# Patient Record
Sex: FEMALE | Race: WHITE | URBAN - METROPOLITAN AREA
[De-identification: names, ages, dates, MRNs, and addresses within clinical notes are randomized per-mention and may not be internally consistent; named-entity substitution may affect disease eponyms.]

---

## 2020-06-25 ENCOUNTER — PROCEDURE VISIT (OUTPATIENT)
Dept: SURGERY | Age: 45
End: 2020-06-25

## 2020-06-25 ENCOUNTER — TELEPHONE (OUTPATIENT)
Dept: SURGERY | Age: 45
End: 2020-06-25

## 2020-06-25 VITALS — TEMPERATURE: 98.1 F

## 2020-06-25 PROBLEM — Z41.1 ELECTIVE PROCEDURE FOR UNACCEPTABLE COSMETIC APPEARANCE: Status: ACTIVE | Noted: 2020-06-25

## 2020-06-25 PROCEDURE — DM00120 PR DERM ONLY BOTOX INJ LEVEL 3: Performed by: DERMATOLOGY

## 2020-06-25 NOTE — PROGRESS NOTES
BOTULINUM TOXIN PROCEDURE NOTE    Ez Baldwin   YOB: 1975    DATE OF VISIT:  6/25/2020        PATIENT IDENTIFIED PER PROTOCOL: yes  LOCATION(S): forehead, glabella and crows feet  VERIFIED: yes  TECHNIQUES, RISKS, BENEFITS AND ALTERNATIVES EXPLAINED: yes  CONSENT SIGNED, WITNESSED AND DATED: yes      The patient denies pregnancy, breast-feeding, neuromuscular disorders, or having any allergies to the known components of Xeomin. RISKS: Pain with treatment, brow ptosis, eyelid ptosis, bruising, swelling, asymmetry, diploplia, ectropion and unfavorable cosmetic outcome. The impermanent nature of the treatment and its onset were emphasized. The patient was informed that botulinum toxin is FDA approved for the treatment of glabellar and lateral canthal (crow's feet) rhytides and its use for treatment of rhytides in some other areas of the face may be considered off-label use. Pre-procedure photographs were taken. OPERATIVE REPORT      TREATMENT # 1  AREA TO BE TREATED: forehead, glabella and crows feet  PROCEDURE NOTE:  12  Units of Xeomin  Injected into the glabella, 6 units in each crows feet and 8 units in forehead.  32 units total.  COMPLICATIONS: none  WOUND CARE INSTRUCTIONS PROVIDED: yes

## 2020-10-21 ENCOUNTER — PROCEDURE VISIT (OUTPATIENT)
Dept: SURGERY | Age: 45
End: 2020-10-21

## 2020-10-21 VITALS — TEMPERATURE: 97.2 F

## 2020-10-21 PROCEDURE — DM00120 PR DERM ONLY BOTOX INJ LEVEL 3: Performed by: DERMATOLOGY

## 2020-10-21 NOTE — PROGRESS NOTES
BOTULINUM TOXIN PROCEDURE NOTE    Db Gerardo   YOB: 1975    DATE OF VISIT:  10/21/2020        PATIENT IDENTIFIED PER PROTOCOL: yes  LOCATION(S): forehead, glabella and crows feet  VERIFIED: yes  TECHNIQUES, RISKS, BENEFITS AND ALTERNATIVES EXPLAINED: yes  CONSENT SIGNED, WITNESSED AND DATED: yes      The patient denies pregnancy, breast-feeding, neuromuscular disorders, or having any allergies to the known components of Xeomin. RISKS: Pain with treatment, brow ptosis, eyelid ptosis, bruising, swelling, asymmetry, diploplia, ectropion and unfavorable cosmetic outcome. The impermanent nature of the treatment and its onset were emphasized. The patient was informed that botulinum toxin is FDA approved for the treatment of glabellar and lateral canthal (crow's feet) rhytides and its use for treatment of rhytides in some other areas of the face may be considered off-label use. Pre-procedure photographs were taken. OPERATIVE REPORT      TREATMENT # 2  AREA TO BE TREATED: forehead, glabella and crows feet  PROCEDURE NOTE:  12  Units of Xeomin  Injected into the glabella, 6 units in each crows feet and 8 units in forehead.  32 units total.  COMPLICATIONS: none  WOUND CARE INSTRUCTIONS PROVIDED: yes

## 2021-03-03 ENCOUNTER — PROCEDURE VISIT (OUTPATIENT)
Dept: SURGERY | Age: 46
End: 2021-03-03

## 2021-03-03 VITALS — TEMPERATURE: 97 F

## 2021-03-03 DIAGNOSIS — Z41.1 ELECTIVE PROCEDURE FOR UNACCEPTABLE COSMETIC APPEARANCE: Primary | ICD-10-CM

## 2021-03-03 PROCEDURE — DM00120 PR DERM ONLY BOTOX INJ LEVEL 3: Performed by: DERMATOLOGY

## 2024-08-08 ENCOUNTER — ANESTHESIA EVENT (OUTPATIENT)
Dept: ENDOSCOPY | Age: 49
End: 2024-08-08
Payer: COMMERCIAL

## 2024-08-08 ENCOUNTER — ANESTHESIA (OUTPATIENT)
Dept: ENDOSCOPY | Age: 49
End: 2024-08-08
Payer: COMMERCIAL

## 2024-08-08 ENCOUNTER — HOSPITAL ENCOUNTER (OUTPATIENT)
Age: 49
Setting detail: OUTPATIENT SURGERY
Discharge: HOME | End: 2024-08-08
Attending: INTERNAL MEDICINE | Admitting: INTERNAL MEDICINE
Payer: COMMERCIAL

## 2024-08-08 VITALS
DIASTOLIC BLOOD PRESSURE: 65 MMHG | TEMPERATURE: 97.6 F | SYSTOLIC BLOOD PRESSURE: 111 MMHG | RESPIRATION RATE: 16 BRPM | OXYGEN SATURATION: 100 % | HEART RATE: 66 BPM

## 2024-08-08 PROCEDURE — 2580000003 HC RX 258: Performed by: ANESTHESIOLOGY

## 2024-08-08 PROCEDURE — 7100000010 HC PHASE II RECOVERY - FIRST 15 MIN: Performed by: INTERNAL MEDICINE

## 2024-08-08 PROCEDURE — 6360000002 HC RX W HCPCS

## 2024-08-08 PROCEDURE — 7100000011 HC PHASE II RECOVERY - ADDTL 15 MIN: Performed by: INTERNAL MEDICINE

## 2024-08-08 PROCEDURE — 3609027000 HC COLONOSCOPY: Performed by: INTERNAL MEDICINE

## 2024-08-08 PROCEDURE — 2500000003 HC RX 250 WO HCPCS

## 2024-08-08 PROCEDURE — 3700000001 HC ADD 15 MINUTES (ANESTHESIA): Performed by: INTERNAL MEDICINE

## 2024-08-08 PROCEDURE — 3700000000 HC ANESTHESIA ATTENDED CARE: Performed by: INTERNAL MEDICINE

## 2024-08-08 RX ORDER — LIDOCAINE HYDROCHLORIDE 20 MG/ML
INJECTION, SOLUTION INFILTRATION; PERINEURAL PRN
Status: DISCONTINUED | OUTPATIENT
Start: 2024-08-08 | End: 2024-08-08 | Stop reason: SDUPTHER

## 2024-08-08 RX ORDER — SODIUM CHLORIDE, SODIUM LACTATE, POTASSIUM CHLORIDE, CALCIUM CHLORIDE 600; 310; 30; 20 MG/100ML; MG/100ML; MG/100ML; MG/100ML
INJECTION, SOLUTION INTRAVENOUS CONTINUOUS
Status: DISCONTINUED | OUTPATIENT
Start: 2024-08-08 | End: 2024-08-08 | Stop reason: HOSPADM

## 2024-08-08 RX ORDER — PROPOFOL 10 MG/ML
INJECTION, EMULSION INTRAVENOUS PRN
Status: DISCONTINUED | OUTPATIENT
Start: 2024-08-08 | End: 2024-08-08 | Stop reason: SDUPTHER

## 2024-08-08 RX ADMIN — SODIUM CHLORIDE, POTASSIUM CHLORIDE, SODIUM LACTATE AND CALCIUM CHLORIDE: 600; 310; 30; 20 INJECTION, SOLUTION INTRAVENOUS at 07:40

## 2024-08-08 RX ADMIN — LIDOCAINE HYDROCHLORIDE 50 MG: 20 INJECTION, SOLUTION INFILTRATION; PERINEURAL at 08:15

## 2024-08-08 RX ADMIN — PROPOFOL 150 MCG/KG/MIN: 10 INJECTION, EMULSION INTRAVENOUS at 08:17

## 2024-08-08 RX ADMIN — PROPOFOL 50 MG: 10 INJECTION, EMULSION INTRAVENOUS at 08:16

## 2024-08-08 RX ADMIN — PROPOFOL 30 MG: 10 INJECTION, EMULSION INTRAVENOUS at 08:18

## 2024-08-08 ASSESSMENT — PAIN - FUNCTIONAL ASSESSMENT
PAIN_FUNCTIONAL_ASSESSMENT: 0-10
PAIN_FUNCTIONAL_ASSESSMENT: 0-10

## 2024-08-08 NOTE — ANESTHESIA POSTPROCEDURE EVALUATION
Department of Anesthesiology  Postprocedure Note    Patient: Alaina Rodriguez  MRN: [id number]  YOB: 1975  Date of evaluation: 8/8/2024    Procedure Summary       Date: 08/08/24 Room / Location: [place] [address] / [facility]    Anesthesia Start: 0812 Anesthesia Stop: 0835    Procedure: COLONOSCOPY Diagnosis:       Colon cancer screening      (Colon cancer screening [Z12.11])    Surgeons: Edelmira Carrillo MD Responsible Provider: Preston Sanford MD    Anesthesia Type: MAC ASA Status: 1            Anesthesia Type: No value filed.    Janice Phase I: Janice Score: 10    Janice Phase II: Janice Score: 10    Anesthesia Post Evaluation    Patient location during evaluation: PACU  Patient participation: complete - patient participated  Level of consciousness: awake  Airway patency: patent  Nausea & Vomiting: no nausea and no vomiting  Cardiovascular status: blood pressure returned to baseline and hemodynamically stable  Respiratory status: acceptable  Hydration status: euvolemic  Multimodal analgesia pain management approach  Pain management: adequate    No notable events documented.

## 2024-08-08 NOTE — ANESTHESIA PRE PROCEDURE
Department of Anesthesiology  Preprocedure Note       Name:  Alaina Rodriguez   Age:  49 y.o.  :  1975                                          MRN:  [id number]         Date:  2024      Surgeon: Surgeon(s):  Edelmira Carrillo MD    Procedure: Procedure(s):  COLONOSCOPY    Medications prior to admission:   Prior to Admission medications    Not on File       Current medications:    No current facility-administered medications for this encounter.       Allergies:  No Known Allergies    Problem List:    Patient Active Problem List   Diagnosis Code   • Elective procedure for unacceptable cosmetic appearance Z41.1       Past Medical History:  No past medical history on file.    Past Surgical History:  No past surgical history on file.    Social History:    Social History     Tobacco Use   • Smoking status: Never   • Smokeless tobacco: Never   Substance Use Topics   • Alcohol use: Not on file                                Counseling given: Not Answered      Vital Signs (Current): There were no vitals filed for this visit.                                           BP Readings from Last 3 Encounters:   No data found for BP       NPO Status:                                                                                 BMI:   Wt Readings from Last 3 Encounters:   No data found for Wt     There is no height or weight on file to calculate BMI.    CBC: No results found for: \"WBC\", \"RBC\", \"HGB\", \"HCT\", \"MCV\", \"RDW\", \"PLT\"    CMP: No results found for: \"NA\", \"K\", \"CL\", \"CO2\", \"BUN\", \"CREATININE\", \"GFRAA\", \"AGRATIO\", \"LABGLOM\", \"GLUCOSE\", \"GLU\", \"CALCIUM\", \"BILITOT\", \"ALKPHOS\", \"AST\", \"ALT\"    POC Tests: No results for input(s): \"POCGLU\", \"POCNA\", \"POCK\", \"POCCL\", \"POCBUN\", \"POCHEMO\", \"POCHCT\" in the last 72 hours.    Coags: No results found for: \"PROTIME\", \"INR\", \"APTT\"    HCG (If Applicable): No results found for: \"PREGTESTUR\", \"PREGSERUM\", \"HCG\", \"HCGQUANT\"     ABGs: No results found for: \"PHART\", \"PO2ART\",

## 2024-08-08 NOTE — PROCEDURES
PROCEDURE NOTE  Date: 2024   Name: Alaina Rodriguez  YOB: 1975    Procedures            Colonoscopy Procedure  Note          Patient: Alaina Rodriguez  : 1975  CRN:  [unfilled]    Procedure: Colonoscopy     Date:  2024    Surgeon:  Edelmira Carrillo MD, MD    Referring Physician:  Rachel Rocha MD    Preoperative Diagnosis:  Colon cancer screening [Z12.11]    Postoperative Diagnosis: Mucosa throughout the colon and terminal ileum was normal.  Small to medium internal hemorrhoids.    Anesthesia:  MAC    EBL: Minimal to none.    Indications: This is a 49 y.o. year old female who presents today with screening for colon cancer.      Procedure:   An informed consent was obtained from the patient after explanation of indications, benefits, possible risks and complications of the procedure.  The patient was then taken to the endoscopy suite, placed in the left lateral decubitus position, and the above IV anesthesia was administered.      Digital rectal examination was performed.  With the patient in the left lateral decubitus position the endoscope was inserted through the anorectal area into the rectum. The scope was then advanced through the length of the colon to the terminal ileum.  The quality of preparation was adequate.  The scope was carefully withdrawn and the mucosa was fully inspected including retroflexion in the rectum. Findings and interventions are described below.      The patient tolerated the procedure well and was taken to the PACU in good condition.  There were no immediate complications.      Impression:  Normal colonoscopy to the cecum with no evidence of neoplasia, diverticular disease or mucosal abnormality.    Recommendations:  Repeat colonoscopy in 10 years.    Edelmira Carrillo MD, [facility] [place]   [facility]  2024      Please note that some or all of this record was generated using voice recognition software. If there are any questions about the content of

## 2024-08-08 NOTE — DISCHARGE INSTRUCTIONS
forgotten.            We want to thank you for choosing the [facility] as your health care provider. We always strive to provide you with excellent care while you are here. You may receive a survey in the mail regarding your care. We would appreciate you taking a few minutes of your time to complete this survey.

## 2024-08-08 NOTE — H&P
Gastroenterology Note             Pre-operative History and Physical    Patient: Alaina Rodriguez  : 1975  CSN: [id number]    History Obtained From:  patient and/or guardian.     HISTORY OF PRESENT ILLNESS:    The patient is a 49 y.o. female  here for initial screening colonoscopy.      Past Medical History:    History reviewed. No pertinent past medical history.  Past Surgical History:    History reviewed. No pertinent surgical history.  Medications Prior to Admission:   No current facility-administered medications on file prior to encounter.     No current outpatient medications on file prior to encounter.        Allergies:  Patient has no known allergies.      Social History:   Social History     Tobacco Use    Smoking status: Never    Smokeless tobacco: Never   Substance Use Topics    Alcohol use: Not on file     Family History:   History reviewed. No pertinent family history.    PHYSICAL EXAM:      /64   Pulse 79   Temp 97.6 °F (36.4 °C) (Temporal)   Resp 16   SpO2 100%  I        Heart:   within normal limits    Lungs:  Unlabored    Abdomen:   soft, NT, ND      ASA Grade:  ASA 2 - Patient with mild systemic disease with no functional limitations    Mallampati Class: 2      ASSESSMENT AND PLAN:    1.  Patient is a 49 y.o. female here for /Colonoscopy.   2.  Procedure options, risks and benefits reviewed with patient or guardian.  Patient or guardian expresses understanding and wishes to proceed.    Edelmira Carrillo MD, FACG  [facility]  2024    Please note that some or all of this record was generated using voice recognition software. If there are any questions about the content of this document, please contact the author as some errors in translation may have occurred.

## 2024-08-08 NOTE — FLOWSHEET NOTE
Ambulatory Surgery/Procedure Discharge Note    Vitals:    08/08/24 0850   BP: (!) 116/56   Pulse: 68   Resp: 16   Temp:    SpO2: 99%       In: 400 [I.V.:400]  Out: -     Restroom use offered before discharge.  Yes    Pain assessment:  level of pain (1-10, 10 severe),   Pain Level: 0  BP within 20% of admission BP      Pt and friend state \"ready to go home\". Pt alert and oriented x4. IV removed. Denies N/V or pain. Pt tolerating po intake. Discharge instructions given to pt and friend with pt permission. Pt and friend verbalized understanding of all instructions. Left with all belongings and written discharge instructions.   Patient discharged to home/self care. Patient discharged via wheel chair by transporter to waiting friend.       8/8/2024 9:01 AM